# Patient Record
Sex: FEMALE
[De-identification: names, ages, dates, MRNs, and addresses within clinical notes are randomized per-mention and may not be internally consistent; named-entity substitution may affect disease eponyms.]

---

## 2020-09-09 PROBLEM — Z00.00 ENCOUNTER FOR PREVENTIVE HEALTH EXAMINATION: Status: ACTIVE | Noted: 2020-09-09

## 2020-09-09 NOTE — HISTORY OF PRESENT ILLNESS
[Medical Office: ([facility])___] : at the medical office located in  [Home] : at home, [unfilled] , at the time of the visit. [Verbal consent obtained from patient] : the patient, [unfilled]

## 2020-09-10 ENCOUNTER — APPOINTMENT (OUTPATIENT)
Dept: NEUROSURGERY | Facility: CLINIC | Age: 35
End: 2020-09-10

## 2020-09-14 ENCOUNTER — APPOINTMENT (OUTPATIENT)
Dept: NEUROSURGERY | Facility: CLINIC | Age: 35
End: 2020-09-14
Payer: COMMERCIAL

## 2020-09-14 VITALS — WEIGHT: 150 LBS | HEIGHT: 63 IN | BODY MASS INDEX: 26.58 KG/M2

## 2020-09-14 DIAGNOSIS — Z78.9 OTHER SPECIFIED HEALTH STATUS: ICD-10-CM

## 2020-09-14 DIAGNOSIS — H93.A9 PULSATILE TINNITUS, UNSPECIFIED EAR: ICD-10-CM

## 2020-09-14 PROCEDURE — 99202 OFFICE O/P NEW SF 15 MIN: CPT | Mod: 95

## 2020-09-14 NOTE — REASON FOR VISIT
[Follow-Up: _____] : a [unfilled] follow-up visit [New Patient Visit] : a new patient visit [Referred By: _________] : Patient was referred by [facility]

## 2020-09-14 NOTE — PHYSICAL EXAM
[Place] : oriented to place [Person] : oriented to person [Time] : oriented to time [Motor Handedness Left-Handed] : the patient is left hand dominant [Exaggerated Use Of Accessory Muscles For Inspiration] : no accessory muscle use [] : no respiratory distress

## 2020-09-14 NOTE — PHYSICAL EXAM
[Person] : oriented to person [Place] : oriented to place [Motor Handedness Left-Handed] : the patient is left hand dominant [Time] : oriented to time [Exaggerated Use Of Accessory Muscles For Inspiration] : no accessory muscle use [] : no respiratory distress

## 2020-09-21 NOTE — HISTORY OF PRESENT ILLNESS
[Home] : at home, [unfilled] , at the time of the visit. [Other:____] : [unfilled] [Medical Office: ([facility])___] : at the medical office located in  [Verbal consent obtained from patient] : the patient, [unfilled] [> 3 months] : more  than 3 months [FreeTextEntry1] : "Right ear pulsatile tinnitus" [de-identified] : Rambo Mathur is a pleasant 34 year old lady who presents for consultation RIGHT ear pulsatile tinnitus (PT) that has been present for about 5 months.  Pt states that she believes that right ear sounds started after she slept with her neck in a position that contributed to a stiff neck and pain that lasted for about 2 months.  She states that the sounds correlate to her heartbeat and stops with gentle pressure to her right neck.  The sound is a whooshing sound.\par \par Sounds in right ear are loudest at night time when it is quiet but are hardly noticeable when there is ambient noise.  The noise affects her quality of life about 4-5/10.  She reports that she may have some right ear hearing deficit but has not followed up with an ENT specialist as yet.\par \par Denies headaches or blurry or double vision. Denies transient visual obscurations.\par \par The pulsatile tinnitus is moderately bothersome.\par \par Pt had imaging done at [facility] and will forward to us.\par \par Telehealth visit started at 2 pm - 2:30 pm.

## 2020-09-21 NOTE — PLAN
[FreeTextEntry1] : RIGHT Pulsatile tinnitus, likely from venous etiology.\par \par We discussed possible causes of pulsatile tinnitus. These include:\par ENT causes such as semicircular canal dehiscence.\par Cardiac causes such as aortic stenosis, valve disease, heart murmur.\par Hypertension.\par Anemia.\par Thyroid disease.\par Cerebrovascular problems: arteriovenous malformation (AVM), dural arteriovenous fistula (DAVF), Venous sinus stenosis, venous aneurysm, large trans-mastoid emissary vein, carotid stenosis.\par Idiopathic Intracranial Hypertension.\par \par Will review images and decide next steps.\par \par I also recommended she sees ENT and ophthalmologist.\par \par PLAN\par [1] Will send instructions to upload scans on power Arav\par [2] ENT\par [3] Ophthalmology to rule out papilledema \par [4] Will have a follow-up call after I review the images

## 2020-09-21 NOTE — HISTORY OF PRESENT ILLNESS
[Home] : at home, [unfilled] , at the time of the visit. [Other:____] : [unfilled] [Medical Office: ([facility])___] : at the medical office located in  [Verbal consent obtained from patient] : the patient, [unfilled] [> 3 months] : more  than 3 months [FreeTextEntry1] : "Right ear pulsatile tinnitus" [de-identified] : Rambo Mathur is a pleasant 34 year old lady who presents for consultation RIGHT ear pulsatile tinnitus (PT) that has been present for about 5 months.  Pt states that she believes that right ear sounds started after she slept with her neck in a position that contributed to a stiff neck and pain that lasted for about 2 months.  She states that the sounds correlate to her heartbeat and stops with gentle pressure to her right neck.  The sound is a whooshing sound.\par \par Sounds in right ear are loudest at night time when it is quiet but are hardly noticeable when there is ambient noise.  The noise affects her quality of life about 4-5/10.  She reports that she may have some right ear hearing deficit but has not followed up with an ENT specialist as yet.\par \par Denies headaches or blurry or double vision. Denies transient visual obscurations.\par \par The pulsatile tinnitus is moderately bothersome.\par \par Pt had imaging done at [facility] and will forward to us.\par \par Telehealth visit started at 2 pm - 2:30 pm.

## 2024-06-17 ENCOUNTER — RX ONLY (OUTPATIENT)
Age: 39
Setting detail: RX ONLY
End: 2024-06-17

## 2024-06-17 ENCOUNTER — APPOINTMENT (RX ONLY)
Dept: URBAN - METROPOLITAN AREA CLINIC 4 | Facility: CLINIC | Age: 39
Setting detail: DERMATOLOGY
End: 2024-06-17

## 2024-06-17 DIAGNOSIS — L259 CONTACT DERMATITIS AND OTHER ECZEMA, UNSPECIFIED CAUSE: ICD-10-CM

## 2024-06-17 PROBLEM — L23.9 ALLERGIC CONTACT DERMATITIS, UNSPECIFIED CAUSE: Status: ACTIVE | Noted: 2024-06-17

## 2024-06-17 PROCEDURE — ? COUNSELING

## 2024-06-17 PROCEDURE — ? DIAGNOSIS COMMENT

## 2024-06-17 PROCEDURE — ? PRESCRIPTION

## 2024-06-17 PROCEDURE — ? PRESCRIPTION MEDICATION MANAGEMENT

## 2024-06-17 PROCEDURE — 99203 OFFICE O/P NEW LOW 30 MIN: CPT

## 2024-06-17 RX ORDER — CEPHALEXIN 500 MG/1
CAPSULE ORAL
Qty: 14 | Refills: 0 | Status: ERX

## 2024-06-17 RX ORDER — CEPHALEXIN 500 MG/1
CAPSULE ORAL
Qty: 14 | Refills: 0 | Status: CANCELLED | COMMUNITY
Start: 2024-06-17

## 2024-06-17 RX ORDER — CEPHALEXIN 500 MG/1
CAPSULE ORAL
Qty: 14 | Refills: 0 | Status: CANCELLED

## 2024-06-17 RX ORDER — BETAMETHASONE DIPROPIONATE 0.5 MG/G
0.05% CREAM TOPICAL BID
Qty: 45 | Refills: 0 | Status: ERX

## 2024-06-17 RX ORDER — BETAMETHASONE DIPROPIONATE 0.5 MG/G
0.05% CREAM TOPICAL BID
Qty: 45 | Refills: 0 | Status: CANCELLED | COMMUNITY
Start: 2024-06-17

## 2024-06-17 RX ADMIN — BETAMETHASONE DIPROPIONATE 0.05%: 0.5 CREAM TOPICAL at 00:00

## 2024-06-17 RX ADMIN — CEPHALEXIN: 500 CAPSULE ORAL at 00:00

## 2024-06-17 ASSESSMENT — LOCATION SIMPLE DESCRIPTION DERM
LOCATION SIMPLE: RIGHT WRIST
LOCATION SIMPLE: LEFT FOREARM
LOCATION SIMPLE: RIGHT FOREARM
LOCATION SIMPLE: LEFT WRIST

## 2024-06-17 ASSESSMENT — LOCATION DETAILED DESCRIPTION DERM
LOCATION DETAILED: LEFT DORSAL WRIST
LOCATION DETAILED: RIGHT VENTRAL DISTAL FOREARM
LOCATION DETAILED: LEFT VENTRAL DISTAL FOREARM
LOCATION DETAILED: RIGHT DORSAL WRIST

## 2024-06-17 ASSESSMENT — LOCATION ZONE DERM: LOCATION ZONE: ARM

## 2024-06-17 NOTE — HPI: RASH
What Type Of Note Output Would You Prefer (Optional)?: Bullet Format
Is The Patient Presenting As Previously Scheduled?: Yes
Is This A New Presentation, Or A Follow-Up?: Rash
Additional History: Pt had been working in the yard last Saturday and noticed rash 2 days later. Pt had used Ivyrest, bacitracin, and ? Other topical with limited improvement. Pt started triamcinolone cream yesterday and noticed worsening after couple of applications. Denies fevers. Feels fine otherwise.

## 2024-06-17 NOTE — PROCEDURE: DIAGNOSIS COMMENT
Render Risk Assessment In Note?: no
Comment: Suspect ACD to plants and possible associated ACD to bacitracin or other topical
Detail Level: Simple

## 2024-06-17 NOTE — PROCEDURE: PRESCRIPTION MEDICATION MANAGEMENT
Render In Strict Bullet Format?: No
Discontinue Regimen: Bacitracin, Ivyrest, triamcinolone cream
Detail Level: Simple
Initiate Treatment: Betamethasone Dipropinate 0.05% cram - apply bid to affected areas x 10 days\\nBenadryl- take 1 po qhs as needed for itch (reviewed risk of sedation)\\nCephalexin 500 mg take one po q 12 hours for 1 week (will cover due to report of pain and concern of early infection)

## 2024-06-24 ENCOUNTER — APPOINTMENT (RX ONLY)
Dept: URBAN - METROPOLITAN AREA CLINIC 4 | Facility: CLINIC | Age: 39
Setting detail: DERMATOLOGY
End: 2024-06-24

## 2024-06-24 DIAGNOSIS — L259 CONTACT DERMATITIS AND OTHER ECZEMA, UNSPECIFIED CAUSE: ICD-10-CM | Status: IMPROVED

## 2024-06-24 DIAGNOSIS — L98.8 OTHER SPECIFIED DISORDERS OF THE SKIN AND SUBCUTANEOUS TISSUE: ICD-10-CM

## 2024-06-24 PROBLEM — L23.9 ALLERGIC CONTACT DERMATITIS, UNSPECIFIED CAUSE: Status: ACTIVE | Noted: 2024-06-24

## 2024-06-24 PROCEDURE — ? DIAGNOSIS COMMENT

## 2024-06-24 PROCEDURE — ? COUNSELING

## 2024-06-24 PROCEDURE — ? PRESCRIPTION MEDICATION MANAGEMENT

## 2024-06-24 PROCEDURE — 99213 OFFICE O/P EST LOW 20 MIN: CPT

## 2024-06-24 ASSESSMENT — LOCATION DETAILED DESCRIPTION DERM
LOCATION DETAILED: LEFT VENTRAL DISTAL FOREARM
LOCATION DETAILED: RIGHT VENTRAL DISTAL FOREARM
LOCATION DETAILED: RIGHT DORSAL WRIST
LOCATION DETAILED: SUPERIOR MID FOREHEAD
LOCATION DETAILED: LEFT DORSAL WRIST

## 2024-06-24 ASSESSMENT — LOCATION SIMPLE DESCRIPTION DERM
LOCATION SIMPLE: LEFT FOREARM
LOCATION SIMPLE: RIGHT WRIST
LOCATION SIMPLE: RIGHT FOREARM
LOCATION SIMPLE: LEFT WRIST
LOCATION SIMPLE: SUPERIOR FOREHEAD

## 2024-06-24 ASSESSMENT — LOCATION ZONE DERM
LOCATION ZONE: ARM
LOCATION ZONE: FACE

## 2024-06-24 NOTE — PROCEDURE: PRESCRIPTION MEDICATION MANAGEMENT
Render In Strict Bullet Format?: No
Discontinue Regimen: Benadryl po\\nCephalexin 500 mg take one po q 12 hours for 1 week (will cover due to report of pain and concern of early infection)
Detail Level: Simple
Continue Regimen: Betamethasone Dipropinate 0.05% cream apply q day for 1 week, then q other day for 1 week, then d/c